# Patient Record
Sex: MALE | Race: BLACK OR AFRICAN AMERICAN | Employment: PART TIME | URBAN - METROPOLITAN AREA
[De-identification: names, ages, dates, MRNs, and addresses within clinical notes are randomized per-mention and may not be internally consistent; named-entity substitution may affect disease eponyms.]

---
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
## 2021-06-19 ENCOUNTER — HOSPITAL ENCOUNTER (EMERGENCY)
Facility: CLINIC | Age: 21
Discharge: HOME | End: 2021-06-19
Attending: EMERGENCY MEDICINE | Admitting: EMERGENCY MEDICINE
Payer: COMMERCIAL

## 2021-06-19 VITALS
RESPIRATION RATE: 19 BRPM | TEMPERATURE: 97.6 F | BODY MASS INDEX: 27.12 KG/M2 | WEIGHT: 200 LBS | HEART RATE: 80 BPM | OXYGEN SATURATION: 100 % | DIASTOLIC BLOOD PRESSURE: 86 MMHG | SYSTOLIC BLOOD PRESSURE: 143 MMHG

## 2021-06-19 DIAGNOSIS — K08.89 PAIN, DENTAL: ICD-10-CM

## 2021-06-19 PROCEDURE — 99283 EMERGENCY DEPT VISIT LOW MDM: CPT

## 2021-06-19 PROCEDURE — 250N000013 HC RX MED GY IP 250 OP 250 PS 637: Performed by: EMERGENCY MEDICINE

## 2021-06-19 RX ORDER — ACETAMINOPHEN 500 MG
1000 TABLET ORAL ONCE
Status: COMPLETED | OUTPATIENT
Start: 2021-06-19 | End: 2021-06-19

## 2021-06-19 RX ORDER — IBUPROFEN 600 MG/1
600 TABLET, FILM COATED ORAL ONCE
Status: COMPLETED | OUTPATIENT
Start: 2021-06-19 | End: 2021-06-19

## 2021-06-19 RX ADMIN — ACETAMINOPHEN 1000 MG: 500 TABLET, FILM COATED ORAL at 04:27

## 2021-06-19 RX ADMIN — IBUPROFEN 600 MG: 600 TABLET ORAL at 04:27

## 2021-06-19 NOTE — ED PROVIDER NOTES
History   Chief Complaint:  Dental Pain       The history is provided by the patient.      Marylin Davila is a 21 year old male who presents with dental pain. Marcia states he has been experiencing a back right mandibular dental pain for the last couple months. He mentions attempting sensodine toothpaste and a partial dose of tylenol several days ago that did not relieve pain. He mentions he drove to the ED today.       Review of Systems   HENT: Positive for dental problem.    All other systems reviewed and are negative.        Allergies:  The patient has no known allergies.     Medications:  Albuterol    Past Medical History:    Autism spectrum disorder  Speech disturbance    Family History:    Father - diabetes type II    Social History:  The patient is unaccompanied today in the ED.     Physical Exam     Patient Vitals for the past 24 hrs:   BP Temp Temp src Pulse Resp SpO2 Weight   06/19/21 0335 (!) 143/86 97.6  F (36.4  C) Temporal 80 19 100 % 90.7 kg (200 lb)       Physical Exam  Vitals: reviewed by me  General: Pt seen on [facility], cooperative, and alert to conversation  Eyes: Tracking well, clear conjunctiva BL  ENT: MMM, midline trachea.  Right mandibular molar with slight tenderness to palpation.  The tooth appears to be intact, no surrounding erythema, no fluctuance or abscess noted.  Airway widely patent.  No facial swelling.  Lungs: No tachypnea, no accessory muscle use. No respiratory distress.   CV: Rate as above  MSK: no joint effusion.  No evidence of trauma  Skin: No rash  Neuro: Clear speech and no facial droop.  Psych: Not RIS, no e/o AH/VH      Emergency Department Course   Emergency Department Course:    Reviewed:  0415 I reviewed nursing notes, vitals, past medical history and care everywhere    Assessments:  0418 I obtained history and examined the patient as noted above.     0441 I rechecked the patient and explained findings.       Interventions:  0427 Tylenol 1000  mg PO, Ibuprofen 600 mg PO    Disposition:  The patient was discharged to home.       Impression & Plan     Medical Decision Making:  Giovanni, a very pleasant 21 year old male, who presents in the emergency room with dental pain for several months to a year. He is not using over the counter medications as he should at maximum doses, and I do think this is the logical first first step. Will advise full dose tylenol and motrin for him to go home with, he feels comfortable with this plan, his tooth in question does not appear to be infected, nor does it appear to have surrounding abscess. He has no facial swelling. He does need to follow up with his dentist of course, and red flags to come back to the ER was discussed.         Diagnosis:    ICD-10-CM    1. Pain, dental  K08.89        Discharge Medications:  New Prescriptions    No medications on file       Scribe Disclosure:  I, Merced Boss, am serving as a scribe at 4:18 AM on 6/19/2021 to document services personally performed by Iraj Dial based on my observations and the provider's statements to me.            Iraj Dial MD  06/19/21 0630

## 2021-06-19 NOTE — ED TRIAGE NOTES
Pt c/o severe right sided dental pain - reports he has an appointment on Wednesday, but can't wait that long

## 2021-06-19 NOTE — DISCHARGE INSTRUCTIONS
Please come back to the hospital immediately with any worsening symptoms, if you have any facial swelling, or if Tylenol Motrin do not help with your pain.  Please take 1000 mg of Tylenol every 8 hours, and 800 mg of Motrin every 8 hours to help with your pain.  Come back with any other concerns or questions, be absolutely certain to follow with your own dentist as soon as possible, or the dental referral to be gave you here.

## 2022-01-06 ENCOUNTER — NURSE TRIAGE (OUTPATIENT)
Dept: NURSING | Facility: CLINIC | Age: 22
End: 2022-01-06

## 2022-01-06 NOTE — TELEPHONE ENCOUNTER
Brother vomited yesterday. Can we do a covid-19 test on him? The school wants him tested. I connected him with scheduling for a virtual visit. I explained he needs the MD order and will get a call back to arrange the day, time and location of testing.  Maty Ellingotn RN  [facility]    Additional Information    Negative: Nursing judgment    Negative: Nursing judgment    Negative: Nursing judgment    Negative: Nursing judgment    Information only question and nurse able to answer    Protocols used: INFORMATION ONLY CALL - NO TRIAGE-A-OH